# Patient Record
Sex: MALE | Race: WHITE
[De-identification: names, ages, dates, MRNs, and addresses within clinical notes are randomized per-mention and may not be internally consistent; named-entity substitution may affect disease eponyms.]

---

## 2018-02-12 ENCOUNTER — HOSPITAL ENCOUNTER (OUTPATIENT)
Dept: HOSPITAL 96 - M.CRD | Age: 73
End: 2018-02-12
Attending: INTERNAL MEDICINE
Payer: MEDICARE

## 2018-02-12 DIAGNOSIS — I37.1: ICD-10-CM

## 2018-02-12 DIAGNOSIS — I21.3: ICD-10-CM

## 2018-02-12 DIAGNOSIS — I25.5: Primary | ICD-10-CM

## 2018-02-12 DIAGNOSIS — I34.0: ICD-10-CM

## 2018-02-12 DIAGNOSIS — I70.0: ICD-10-CM

## 2018-02-12 DIAGNOSIS — I25.119: ICD-10-CM

## 2018-02-12 NOTE — 2DMMODE
[place] [address]
Phone:  [phone] 2 D/M-MODE ECHOCARDIOGRAM     
_______________________________________________________________________________
 
Name:         SAFIAAMAIRANI CLAUDIO               Room:                     REG CLI
M.R.#:    [id number]     Account #:     [id number]  
Admission:    18    Attend Phys:   Geraldo Varela,
Discharge:                Date of Birth: [birth year]  
Date of Service: 18 1338  Report #:      [id number]
        [id number]
_______________________________________________________________________________
THIS REPORT FOR:  //name//                      
 
 
--------------- APPROVED REPORT --------------
 
 
Study performed:  2018 09:05:18
 
EXAM: Comprehensive 2D, Doppler, and color-flow 
Echocardiogram 
Patient Location: Out-Patient   
      Status:  routine
 
      BSA:         1.85
HR: 74 bpm BP:          110/70 mmHg 
 
Other Information 
Study Quality: Good
 
Indications
Cardiomyopathy
 
2D Dimensions
   LVEF(%):  44.64 (&gt;50%)
IVSd:  12.19 (7-11mm) LVOT Diam:  20.61 (18-24mm) 
LVDd:  58.99 mm  
PWd:  10.86 (7-11mm) Ascending Ao:  29.47 (22-36mm)
LVDs:  45.70 (25-40mm) 
Aortic Root:  28.15 mm 
   Bowman's LVEF:  44.64 %
 
Volumes
Left Atrial Volume (Systole) 
    LA ESV Index:  23.30 mL/m2
 
Aortic Valve
AoV Peak Edi.:  1.03 m/s 
AO Peak Gr.:  4.27 mmHg  LVOT Max P.07 mmHg
AO Mean Gr.:  2.39 mmHg  LVOT Mean P.95 mmHg
    LVOT Max V:  0.72 m/s
AO V2 VTI:  20.59 cm  LVOT Mean V:  0.44 m/s
GOKUL (VTI):  2.04 cm2  LVOT V1 VTI:  12.62 cm
 
Mitral Valve
    E/A Ratio:  0.52
    MV Decel. Time:  217.55 ms
 
 
[place] [address]
Phone:  [phone]                     2 D/M-MODE ECHOCARDIOGRAM     
_______________________________________________________________________________
 
Name:         AMAIRANI BAIG               Room:                     REG CLI
M.R.#:    [id number]     Account #:     [id number]  
Admission:    18    Attend Phys:   Geraldo Varela,
Discharge:                Date of Birth: [birth year]  
Date of Service: 18 1338  Report #:      [id number]
        [id number]
_______________________________________________________________________________
MV E Max Edi.:  0.49 m/s 
MV PHT:  63.09 ms  
MVA (PHT):  3.49 cm2  
 
TDI
E/Lateral E':  7.00 E/Medial E':  8.17
   Medial E' Edi.:  0.06 m/s
   Lateral E' Edi.:  0.07 m/s
 
Pulmonary Valve
PV Peak Edi.:  0.78 m/s PV Peak Gr.:  2.45 mmHg
 
Left Ventricle
Left ventricle is mildly dilated. There is diffuse hypokinesis of LV 
wall motion There is normal left ventricular wall thickness. Left 
ventricular systolic function is severely decreased. LVEF is 25%. 
Grade I - abnormal relaxation pattern.
 
Right Ventricle
The right ventricle is normal size. The right ventricular systolic 
function is normal.
 
Atria
The left atrium size is normal. The right atrium size is 
normal.
 
Aortic Valve
Aortic valve is mildly calcified. Trace aortic regurgitation. There 
is no aortic valvular stenosis.
 
Mitral Valve
The mitral valve is normal in structure. Mild mitral regurgitation. 
No evidence of mitral valve stenosis.
 
Tricuspid Valve
The tricuspid valve is normal in structure. There is no tricuspid 
valve regurgitation noted.
 
Pulmonic Valve
The pulmonary valve is normal in structure. Mild pulmonic 
regurgitation.
 
Great Vessels
The aortic root is normal in size. IVC is normal in size and 
collapses with &gt;50% inspiration
 
 
 
[place] [address]
Phone:  [phone]                     2 D/M-MODE ECHOCARDIOGRAM     
_______________________________________________________________________________
 
Name:         SAFIAAMAIRANI CLAUDIO               Room:                     REG CLI
M.R.#:    [id number]     Account #:     [id number]  
Admission:    18    Attend Phys:   Geraldo Varela,
Discharge:                Date of Birth: [birth year]  
Date of Service: 18 1338  Report #:      [id number]
        [id number]
_______________________________________________________________________________
Pericardium
There is no pericardial effusion.
 
&lt;Conclusion&gt;
Left ventricle is mildly dilated.
There is normal left ventricular wall thickness.
Left ventricular systolic function is severely decreased.
LVEF is 25%.
Grade I - abnormal relaxation pattern.
The right ventricle is normal size.
The left atrium size is normal.
The right atrium size is normal.
Aortic valve is mildly calcified.
Trace aortic regurgitation.
There is no aortic valvular stenosis.
The mitral valve is normal in structure.
Mild mitral regurgitation.
The tricuspid valve is normal in structure.
IVC is normal in size and collapses with &gt;50% inspiration
There is no pericardial effusion.
There is diffuse hypokinesis of LV wall motion
 
 
 
 
 
 
 
 
 
 
 
 
 
 
 
 
 
 
 
 
 
 
 
  <ELECTRONICALLY SIGNED>
                                           By: Eloy Sorensen MD, FACC     
  18     1338
D: 18 1338   _____________________________________
T: 18 1338   Eloy Sorensen MD, FACC       /INF

## 2018-02-21 ENCOUNTER — HOSPITAL ENCOUNTER (OUTPATIENT)
Dept: HOSPITAL 35 - CATH | Age: 73
Setting detail: OBSERVATION
LOS: 1 days | Discharge: HOME | End: 2018-02-22
Attending: INTERNAL MEDICINE | Admitting: INTERNAL MEDICINE
Payer: COMMERCIAL

## 2018-02-21 VITALS — DIASTOLIC BLOOD PRESSURE: 65 MMHG | SYSTOLIC BLOOD PRESSURE: 99 MMHG

## 2018-02-21 VITALS — SYSTOLIC BLOOD PRESSURE: 107 MMHG | DIASTOLIC BLOOD PRESSURE: 72 MMHG

## 2018-02-21 VITALS — WEIGHT: 150 LBS | BODY MASS INDEX: 21.47 KG/M2 | HEIGHT: 70 IN

## 2018-02-21 VITALS — SYSTOLIC BLOOD PRESSURE: 102 MMHG | DIASTOLIC BLOOD PRESSURE: 67 MMHG

## 2018-02-21 VITALS — SYSTOLIC BLOOD PRESSURE: 93 MMHG | DIASTOLIC BLOOD PRESSURE: 56 MMHG

## 2018-02-21 DIAGNOSIS — E78.00: ICD-10-CM

## 2018-02-21 DIAGNOSIS — I25.2: ICD-10-CM

## 2018-02-21 DIAGNOSIS — F17.210: ICD-10-CM

## 2018-02-21 DIAGNOSIS — I44.7: ICD-10-CM

## 2018-02-21 DIAGNOSIS — I48.0: ICD-10-CM

## 2018-02-21 DIAGNOSIS — Z95.5: ICD-10-CM

## 2018-02-21 DIAGNOSIS — R06.00: ICD-10-CM

## 2018-02-21 DIAGNOSIS — I25.5: Primary | ICD-10-CM

## 2018-02-21 DIAGNOSIS — R42: ICD-10-CM

## 2018-02-21 DIAGNOSIS — Z82.49: ICD-10-CM

## 2018-02-21 LAB
ANION GAP SERPL CALC-SCNC: 6 MMOL/L (ref 7–16)
APTT BLD: 25 SECONDS (ref 24.5–32.8)
BASOPHILS NFR BLD AUTO: 0.9 % (ref 0–2)
BUN SERPL-MCNC: 26 MG/DL (ref 7–18)
CALCIUM SERPL-MCNC: 9.4 MG/DL (ref 8.5–10.1)
CHLORIDE SERPL-SCNC: 104 MMOL/L (ref 98–107)
CO2 SERPL-SCNC: 30 MMOL/L (ref 21–32)
CREAT SERPL-MCNC: 1.5 MG/DL (ref 0.7–1.3)
EOSINOPHIL NFR BLD: 5.7 % (ref 0–3)
ERYTHROCYTE [DISTWIDTH] IN BLOOD BY AUTOMATED COUNT: 14.8 % (ref 10.5–14.5)
GLUCOSE SERPL-MCNC: 107 MG/DL (ref 74–106)
GRANULOCYTES NFR BLD MANUAL: 61.5 % (ref 36–66)
HCT VFR BLD CALC: 40.7 % (ref 42–52)
HGB BLD-MCNC: 13.5 GM/DL (ref 14–18)
INR PPP: 1
LYMPHOCYTES NFR BLD AUTO: 24.2 % (ref 24–44)
MCH RBC QN AUTO: 30 PG (ref 26–34)
MCHC RBC AUTO-ENTMCNC: 33.3 G/DL (ref 28–37)
MCV RBC: 90.3 FL (ref 80–100)
MONOCYTES NFR BLD: 7.7 % (ref 1–8)
NEUTROPHILS # BLD: 5.6 THOU/UL (ref 1.4–8.2)
PLATELET # BLD: 176 THOU/UL (ref 150–400)
POTASSIUM SERPL-SCNC: 3.9 MMOL/L (ref 3.5–5.1)
PROTHROMBIN TIME: 10.7 SECONDS (ref 9.3–11.4)
RBC # BLD AUTO: 4.5 MIL/UL (ref 4.5–6)
SODIUM SERPL-SCNC: 140 MMOL/L (ref 136–145)
WBC # BLD AUTO: 9.2 THOU/UL (ref 4–11)

## 2018-02-22 VITALS — SYSTOLIC BLOOD PRESSURE: 102 MMHG | DIASTOLIC BLOOD PRESSURE: 65 MMHG

## 2018-02-22 VITALS — SYSTOLIC BLOOD PRESSURE: 102 MMHG | DIASTOLIC BLOOD PRESSURE: 67 MMHG

## 2018-03-20 ENCOUNTER — HOSPITAL ENCOUNTER (OUTPATIENT)
Dept: HOSPITAL 96 - M.SLEEPLAB | Age: 73
End: 2018-03-20
Attending: INTERNAL MEDICINE
Payer: MEDICARE

## 2018-03-20 DIAGNOSIS — G47.30: Primary | ICD-10-CM

## 2018-03-20 DIAGNOSIS — I48.91: ICD-10-CM

## 2018-04-04 NOTE — SLEEP
[address]  [address]                    SLEEP STUDY REPORT            
_______________________________________________________________________________
 
Name:       SAFIAAMAIRANI CHOUDHARY                Room:                      REG CLI 
M.R.#:  [id number]      Account #:      [id number]  
Admission:  03/20/18     Attend Phys:    Geraldo Varela MD
Discharge:               Date of Birth:  [birth year]  
         Report #: [id number]
                                                                     [id number]  
_______________________________________________________________________________
THIS REPORT FOR:  //name//                      
 
CC: Jeanne Varela
 
This study has been reviewed in its entirety by a board certified sleep
specialist
 
date of service 3/20/2018
 
REFERRING PHYSICIAN:  Geraldo Varela MD [facility].
 
TYPE OF STUDY:  Split night study.
 
METHOD:  The following parameters were monitored:  Frontal, central and
occipital EEG; electro-oculogram; submentalis EMG; nasal and oral airflow; 
anterior tibialis EMG; body position and electrocardiogram.  Additionally,
thoracic and abdominal movements were recorded by inductance plethysmography. 
Oxygen saturation was monitored using pulse oximeter.  The tracing was scored
using 30-second epochs.  Hypopneas were scored per the American Academy of
[employer] definition using the 4% desaturation criteria.
 
DIAGNOSTIC PORTION OF THE STUDY:.  During the diagnostic portion of the study,
the  total recording time was 190.2 minutes.  Total sleep time was 118 minutes.
Sleep efficiency was 62%.  Latency to sleep was 31.8 minutes.
 
SLEEP STAGING:  Stage N1 was 36.4% of total sleep time, stage N2 of 44.9% of
total sleep time and stage REM 18.9%, total sleep time and no N3 was recorded.
 
RESPIRATORY DATA:  During the diagnostic portion of the study, a total of 50
episodes of central apneas were recorded.  Occasional episodes of hypopneas
were recorded with a total apnea-hypopnea index of 26.9.  It was noted these
episodes were more pronounced in supine position.
 
OXYGEN DATA:  The average O2 saturation recorded was 96%.  The lowest O2
saturation recorded was 92%.
 
The limb movement index during the diagnostic portion of the study was 47.8. 
The average heart rate was 76.7, with no arrhythmias reported.  Minimal snoring
was recorded during the study.
 
During the titration portion of the study, the patient was titrated on BiPAP at
the pressure of 9/6, 10/5, 10/6, 10/7, 11/6, 11/7, 12/6, 12/7, 13/7 cm of water
of BiPAP.  The longest duration of sleep was captured during pressure of 12/6
cm of water where the central apneas had improved with overall apnea-hypopnea
index was 13.8.
 
 
 
 
[place] [address]                    SLEEP STUDY REPORT            
_______________________________________________________________________________
 
Name:       AMAIRAIN BAIG                Room:                      REG CLI 
M.R.#:  [id number]      Account #:      [id number]  
Admission:  03/20/18     Attend Phys:    Geradlo Varela MD
Discharge:               Date of Birth:  [birth year]  
         Report #: [id number]
                                                                     [id number]  
_______________________________________________________________________________
The O2 saturation remained 92% and above.
 
IMPRESSION:  Central sleep apnea with apnea-hypopnea index of 26.9, O2
saturation remained more than 90%.
 
RECOMMENDATIONS:  
1. Trial of PAP therapy with BiPAP ST therapy at a pressure
of 13/7 cm of water and back up rate of 12 /minute , with a close clinical
followup and data download.  
2. Recommend evaluation for the cause of central sleep apnea, which could be
related to heart failure or neurological disease.  
3.  If the patient has hypersomnia avoid driving or operating machinery while
sleepy.  
4.  Avoid alcohol, hypnotics, sedatives close to bedtime.
 
 
 
 
 
 
 
 
 
 
 
 
 
 
 
 
 
 
 
 
 
 
 
 
 
 
 
 
 
 
<ELECTRONICALLY SIGNED>
                                        By:  Terri Velasquez MD              
04/04/18     1046
D: 04/03/18 1136_______________________________________
T: 04/03/18 1222Djose alfredo Velasquez MD                 /nt

## 2018-04-16 ENCOUNTER — HOSPITAL ENCOUNTER (OUTPATIENT)
Dept: HOSPITAL 96 - M.LAB | Age: 73
End: 2018-04-16
Payer: MEDICARE

## 2018-04-16 DIAGNOSIS — I48.91: ICD-10-CM

## 2018-04-16 DIAGNOSIS — I25.5: ICD-10-CM

## 2018-04-16 DIAGNOSIS — I25.10: ICD-10-CM

## 2018-04-16 DIAGNOSIS — J44.1: Primary | ICD-10-CM

## 2018-04-16 DIAGNOSIS — I50.22: ICD-10-CM

## 2018-04-16 DIAGNOSIS — R53.83: ICD-10-CM

## 2018-04-16 DIAGNOSIS — G47.31: ICD-10-CM

## 2018-04-16 DIAGNOSIS — I10: ICD-10-CM

## 2018-04-16 DIAGNOSIS — G25.81: ICD-10-CM

## 2018-04-16 LAB
ALBUMIN SERPL-MCNC: 4.1 G/DL (ref 3.4–5)
ALP SERPL-CCNC: 61 U/L (ref 46–116)
ALT SERPL-CCNC: 35 U/L (ref 30–65)
ANION GAP SERPL CALC-SCNC: 8 MMOL/L (ref 7–16)
AST SERPL-CCNC: 25 U/L (ref 15–37)
BILIRUB SERPL-MCNC: 0.4 MG/DL
BUN SERPL-MCNC: 20 MG/DL (ref 7–18)
CALCIUM SERPL-MCNC: 9.2 MG/DL (ref 8.5–10.1)
CHLORIDE SERPL-SCNC: 105 MMOL/L (ref 98–107)
CO2 SERPL-SCNC: 29 MMOL/L (ref 21–32)
CREAT SERPL-MCNC: 1.3 MG/DL (ref 0.6–1.3)
GLUCOSE SERPL-MCNC: 121 MG/DL (ref 70–99)
HCT VFR BLD CALC: 42.5 % (ref 42–52)
HGB BLD-MCNC: 14.2 GM/DL (ref 14–18)
IRON SERPL-MCNC: 71 UG/DL (ref 50–175)
MCH RBC QN AUTO: 31.1 PG (ref 26–34)
MCHC RBC AUTO-ENTMCNC: 33.5 G/DL (ref 28–37)
MCV RBC: 92.7 FL (ref 80–100)
MPV: 9.5 FL. (ref 7.2–11.1)
PLATELET COUNT*: 157 THOU/UL (ref 150–400)
POTASSIUM SERPL-SCNC: 4.3 MMOL/L (ref 3.5–5.1)
PROT SERPL-MCNC: 7.7 G/DL (ref 6.4–8.2)
RBC # BLD AUTO: 4.58 MIL/UL (ref 4.5–6)
RDW-CV: 13.3 % (ref 10.5–14.5)
SAO2 % BLD FROM PO2: 21 % (ref 20–39)
SODIUM SERPL-SCNC: 142 MMOL/L (ref 136–145)
WBC # BLD AUTO: 10.7 THOU/UL (ref 4–11)

## 2019-04-15 ENCOUNTER — HOSPITAL ENCOUNTER (OUTPATIENT)
Dept: HOSPITAL 96 - M.SLEEPLAB | Age: 74
End: 2019-04-15
Payer: MEDICARE

## 2019-04-15 DIAGNOSIS — G47.31: ICD-10-CM

## 2019-04-15 DIAGNOSIS — G47.33: Primary | ICD-10-CM

## 2019-04-24 NOTE — SLEEP
[address]  [address]                    SLEEP STUDY REPORT            
_______________________________________________________________________________
 
Name:       AMAIRANI BAIG                Room:                      REG CLI 
M.R.#:  [id number]      Account #:      [id number]  
Admission:  04/15/19     Attend Phys:    Wesley Nugent MD 
Discharge:               Date of Birth:  [birth year]  
         Report #: [id number]
                                                                     [id number]  
_______________________________________________________________________________
THIS REPORT FOR:  //name//                      
 
CC: Jeanne Nugent MD
 
This study has been reviewed in its entirety by a board certified sleep
specialist
 
DATE OF SERVICE:  04/16/2019
 
 
HOME SLEEP STUDY
 
ATTENDING PHYSICIAN:  Dr. Wesley Nugent.
 
The patient is 74 years old, who weighs 150 pounds with a BMI of 21.5.  The
patient underwent home sleep study performed by [facility].  Total
recording time was 589 minutes.
 
During the night study, the patient had 27 central apneas, 225 obstructive
apneas and no mixed apneas and 28 hypopneas.  The patient's apnea-hypopnea index
was 28.6 per hour with a supine index of 32 per hour.
 
Nocturnal oximetry study revealed an average oxygen saturation of 94% with a
lowest of 80%.
 
Mean heart rate was 73 beats per minute.
 
IMPRESSION:
1.  Moderate to severe sleep apnea-hypopnea syndrome.  Total AHI of 28.6 per
hour with a supine AHI of 32 per hour.
2.  No clinically significant nocturnal hypoxia.
 
RECOMMENDATIONS:
1.  The patient would benefit from treatment of underlying sleep apnea with
either oral appliance as recommended by the dentist versus a trial of CPAP.
2.  Once the patient is optimally treated with above,
then follow up in 4-6 weeks to assess compliance with treatment
 and to document clinical improvement.
3.  Avoid CNS depressants.
 
 
 
[place] [address]                    SLEEP STUDY REPORT            
_______________________________________________________________________________
 
Name:       AMAIRANI BAIG                Room:                      REG CLI 
M.R.#:  [id number]      Account #:      [id number]  
Admission:  04/15/19     Attend Phys:    Wesley Nugent MD 
Discharge:               Date of Birth:  [birth year]  
         Report #: [id number]
                                                                     [id number]  
_______________________________________________________________________________
4.  Cautioned regarding driving until symptoms of sleep apnea resolve with the
above recommendations.
 
 
 
 
 
 
 
 
 
 
 
 
 
 
 
 
 
 
 
 
 
 
 
 
 
 
 
 
 
 
 
 
 
 
 
 
 
 
 
 
 
 
<ELECTRONICALLY SIGNED>
                                        By:  Singh Jackson MD              
04/24/19     1144
D: 04/24/19 1010_______________________________________
T: 04/24/19 1019Aheber Jackson MD                 /nt

## 2021-05-10 ENCOUNTER — HOSPITAL ENCOUNTER (OUTPATIENT)
Dept: HOSPITAL 35 - CAT | Age: 76
End: 2021-05-10
Attending: INTERNAL MEDICINE
Payer: COMMERCIAL

## 2021-05-10 DIAGNOSIS — R91.8: ICD-10-CM

## 2021-05-10 DIAGNOSIS — J98.4: ICD-10-CM

## 2021-05-10 DIAGNOSIS — M25.78: ICD-10-CM

## 2021-05-10 DIAGNOSIS — I89.0: ICD-10-CM

## 2021-05-10 DIAGNOSIS — J90: Primary | ICD-10-CM

## 2021-05-11 ENCOUNTER — HOSPITAL ENCOUNTER (OUTPATIENT)
Dept: HOSPITAL 35 - PET | Age: 76
End: 2021-05-11
Attending: INTERNAL MEDICINE
Payer: COMMERCIAL

## 2021-05-11 DIAGNOSIS — R91.8: Primary | ICD-10-CM

## 2021-05-11 DIAGNOSIS — I51.7: ICD-10-CM

## 2021-05-11 DIAGNOSIS — J98.4: ICD-10-CM

## 2021-11-11 ENCOUNTER — HOSPITAL ENCOUNTER (OUTPATIENT)
Dept: HOSPITAL 35 - LAB | Age: 76
End: 2021-11-11
Payer: COMMERCIAL

## 2021-11-11 DIAGNOSIS — R91.8: Primary | ICD-10-CM
